# Patient Record
Sex: FEMALE | Race: WHITE | Employment: UNEMPLOYED | URBAN - METROPOLITAN AREA
[De-identification: names, ages, dates, MRNs, and addresses within clinical notes are randomized per-mention and may not be internally consistent; named-entity substitution may affect disease eponyms.]

---

## 2019-09-12 LAB
HEP C HIM: NORMAL
HIV 1&2 EXT: NORMAL

## 2019-09-30 ENCOUNTER — TRANSFERRED RECORDS (OUTPATIENT)
Dept: HEALTH INFORMATION MANAGEMENT | Facility: CLINIC | Age: 24
End: 2019-09-30

## 2019-09-30 LAB
C TRACH DNA SPEC QL PROBE+SIG AMP: NEGATIVE
HPV ABSTRACT: NORMAL
N GONORRHOEA DNA SPEC QL PROBE+SIG AMP: NEGATIVE
PAP-ABSTRACT: NORMAL
SPECIMEN DESCRIP: NORMAL
SPECIMEN DESCRIPTION: NORMAL

## 2020-07-30 ENCOUNTER — OFFICE VISIT (OUTPATIENT)
Dept: FAMILY MEDICINE | Facility: CLINIC | Age: 25
End: 2020-07-30
Payer: COMMERCIAL

## 2020-07-30 VITALS
SYSTOLIC BLOOD PRESSURE: 133 MMHG | TEMPERATURE: 96.1 F | OXYGEN SATURATION: 97 % | HEIGHT: 64 IN | BODY MASS INDEX: 37.22 KG/M2 | HEART RATE: 58 BPM | WEIGHT: 218 LBS | DIASTOLIC BLOOD PRESSURE: 78 MMHG

## 2020-07-30 DIAGNOSIS — Z91.09 ENVIRONMENTAL ALLERGIES: ICD-10-CM

## 2020-07-30 DIAGNOSIS — R06.83 SNORING: Primary | ICD-10-CM

## 2020-07-30 DIAGNOSIS — R13.10 DYSPHAGIA, UNSPECIFIED TYPE: ICD-10-CM

## 2020-07-30 PROBLEM — F15.922: Status: ACTIVE | Noted: 2017-11-21

## 2020-07-30 PROBLEM — F19.21 HISTORY OF DRUG DEPENDENCE/ABUSE (H): Status: ACTIVE | Noted: 2017-06-02

## 2020-07-30 PROBLEM — A74.9 CHLAMYDIAL INFECTION: Status: ACTIVE | Noted: 2017-11-21

## 2020-07-30 PROCEDURE — 99204 OFFICE O/P NEW MOD 45 MIN: CPT | Performed by: NURSE PRACTITIONER

## 2020-07-30 RX ORDER — CETIRIZINE HYDROCHLORIDE 5 MG/1
10 TABLET ORAL DAILY
Qty: 300 ML | Refills: 0 | Status: SHIPPED | OUTPATIENT
Start: 2020-07-30 | End: 2020-10-22

## 2020-07-30 RX ORDER — FAMOTIDINE 40 MG/5ML
2.5 POWDER, FOR SUSPENSION ORAL DAILY
COMMUNITY
Start: 2020-07-13 | End: 2020-10-22

## 2020-07-30 SDOH — HEALTH STABILITY: MENTAL HEALTH: HOW OFTEN DO YOU HAVE A DRINK CONTAINING ALCOHOL?: NEVER

## 2020-07-30 ASSESSMENT — MIFFLIN-ST. JEOR: SCORE: 1705.9

## 2020-07-30 NOTE — PATIENT INSTRUCTIONS
Referral to sleep medicine for sleep study to look for sleep apnea.     Referral to allergist to continue testing that was started with [facility].     Start Zyrtec 10 ml daily for allergies.

## 2020-07-30 NOTE — PROGRESS NOTES
Subjective     Radha Domingo is a 25 year old female who presents to clinic today for the following health issues:    HPI     Patient is here with request for referrals.  New patient to this clinic.  Per chart review, she was seen in the ER on 7/10/20 reporting throat tightness.  Had the feeling that her throat was closing for several days.  Had pain with swallowing.  This was also associated with some seasonal allergy symptoms.  Had recently been around her friends cat.  Has been eating a lot of nuts.  No history of nut allergy, but they seem to make her throat worse.    also notes patient has been snoring more and waking up at night with difficulty breathing that is concerning for sleep apnea. In the ER she was in stable condition.  COVID-19 testing negative.  Apparently had problems like this 1 year ago but had to put work-up on hold because she became pregnant.      From ER, patient was referred to ENT. Also told she probably needs a sleep study.  She saw ENT on 7/13/20, visit note is not available, but I can see provider ordered xray esophogram which showed a tiny non-obstructive esophageal web in the upper esophagus measuring approximately 2 mm.  Patient states she was referred to [facility], had EGD recently with dilation of her esophagus and biopsies taken. Patient started on liquid famotidine, states pills get stuck.  Starting to feel a little better.  She then saw allergist through [facility] who ordered labs and asked patient to start either Claritin or Zyrtec.  Patient has not had lab work done yet.  She found out [facility] wasn't in her network and that is why she was referred to [facility]- patient needs to establish with new allergist and get set up with sleep study.       Patient Active Problem List   Diagnosis     Chlamydial infection     History of drug dependence/abuse (H)     Stimulant intoxication with perceptual disturbance (H)     Environmental allergies     Dysphagia,  "unspecified type     History reviewed. No pertinent surgical history.    Social History     Tobacco Use     Smoking status: Former Smoker     Smokeless tobacco: Never Used   Substance Use Topics     Alcohol use: Never     Frequency: Never     History reviewed. No pertinent family history.      Current Outpatient Medications   Medication Sig Dispense Refill     cetirizine (ZYRTEC) 5 MG/5ML solution Take 10 mLs (10 mg) by mouth daily 300 mL 0     famotidine (PEPCID) 40 MG/5ML suspension Take 2.5 mLs by mouth daily       Allergies   Allergen Reactions     Animal Dander Swelling       Reviewed and updated as needed this visit by Provider  Tobacco  Allergies  Meds  Problems  Med Hx  Surg Hx  Fam Hx         Review of Systems   Constitutional, HEENT, cardiovascular, pulmonary, GI, , musculoskeletal, neuro, skin, endocrine and psych systems are negative, except as otherwise noted.      Objective    /78   Pulse 58   Temp 96.1  F (35.6  C)   Ht 1.613 m (5' 3.5\")   Wt 98.9 kg (218 lb)   SpO2 97%   BMI 38.01 kg/m    Body mass index is 38.01 kg/m .  Physical Exam   GENERAL: healthy, alert and no distress  EYES: Eyes grossly normal to inspection, PERRL and conjunctivae and sclerae normal  HENT: ear canals and TM's normal, nose and mouth without ulcers or lesions  NECK: no adenopathy, no asymmetry, masses, or scars and thyroid normal to palpation  RESP: lungs clear to auscultation - no rales, rhonchi or wheezes  CV: regular rate and rhythm, normal S1 S2, no S3 or S4, no murmur, click or rub, no peripheral edema and peripheral pulses strong  MS: no gross musculoskeletal defects noted, no edema  SKIN: no suspicious lesions or rashes  NEURO: Normal strength and tone, mentation intact and speech normal  PSYCH: mentation appears normal, affect normal/bright    Diagnostic Test Results:  Labs reviewed in Epic        Assessment & Plan       ICD-10-CM    1. Snoring  R06.83 SLEEP EVALUATION & MANAGEMENT REFERRAL - ADULT " -[facility] [place]  [phone] (Age 15 and up)   2. Dysphagia, unspecified type  R13.10    3. Environmental allergies  Z91.09 ALLERGY/ASTHMA ADULT REFERRAL     cetirizine (ZYRTEC) 5 MG/5ML solution       See Patient Instructions  Patient Instructions   Referral to sleep medicine for sleep study to look for sleep apnea.     Referral to allergist to continue testing that was started with [facility].     Start Zyrtec 10 ml daily for allergies.         Return in about 4 weeks (around 8/27/2020) for If failure to improve.    Deana Mcintyre, [facility]

## 2020-10-22 ENCOUNTER — OFFICE VISIT (OUTPATIENT)
Dept: ALLERGY | Facility: CLINIC | Age: 25
End: 2020-10-22
Payer: COMMERCIAL

## 2020-10-22 VITALS
HEIGHT: 62 IN | BODY MASS INDEX: 40.85 KG/M2 | DIASTOLIC BLOOD PRESSURE: 84 MMHG | OXYGEN SATURATION: 96 % | WEIGHT: 222 LBS | HEART RATE: 75 BPM | SYSTOLIC BLOOD PRESSURE: 130 MMHG

## 2020-10-22 DIAGNOSIS — J30.89 ALLERGIC RHINITIS DUE TO DUST MITE: ICD-10-CM

## 2020-10-22 DIAGNOSIS — R13.10 DYSPHAGIA, UNSPECIFIED TYPE: Primary | ICD-10-CM

## 2020-10-22 DIAGNOSIS — R09.89 THROAT TIGHTNESS: ICD-10-CM

## 2020-10-22 DIAGNOSIS — L50.3 DERMATOGRAPHISM: ICD-10-CM

## 2020-10-22 DIAGNOSIS — L50.9 HIVES: ICD-10-CM

## 2020-10-22 PROCEDURE — 95004 PERQ TESTS W/ALRGNC XTRCS: CPT | Performed by: ALLERGY & IMMUNOLOGY

## 2020-10-22 PROCEDURE — 36415 COLL VENOUS BLD VENIPUNCTURE: CPT | Performed by: ALLERGY & IMMUNOLOGY

## 2020-10-22 PROCEDURE — 86003 ALLG SPEC IGE CRUDE XTRC EA: CPT | Performed by: ALLERGY & IMMUNOLOGY

## 2020-10-22 PROCEDURE — 86003 ALLG SPEC IGE CRUDE XTRC EA: CPT | Mod: 90 | Performed by: ALLERGY & IMMUNOLOGY

## 2020-10-22 PROCEDURE — 86008 ALLG SPEC IGE RECOMB EA: CPT | Mod: 59 | Performed by: ALLERGY & IMMUNOLOGY

## 2020-10-22 PROCEDURE — 99244 OFF/OP CNSLTJ NEW/EST MOD 40: CPT | Mod: 25 | Performed by: ALLERGY & IMMUNOLOGY

## 2020-10-22 PROCEDURE — 99000 SPECIMEN HANDLING OFFICE-LAB: CPT | Performed by: ALLERGY & IMMUNOLOGY

## 2020-10-22 RX ORDER — FLUTICASONE PROPIONATE 50 MCG
2 SPRAY, SUSPENSION (ML) NASAL DAILY
Qty: 16 G | Refills: 11 | Status: SHIPPED | OUTPATIENT
Start: 2020-10-22

## 2020-10-22 RX ORDER — AZELASTINE 1 MG/ML
2 SPRAY, METERED NASAL 2 TIMES DAILY
Qty: 1 BOTTLE | Refills: 11 | Status: SHIPPED | OUTPATIENT
Start: 2020-10-22

## 2020-10-22 ASSESSMENT — PAIN SCALES - GENERAL: PAINLEVEL: NO PAIN (0)

## 2020-10-22 ASSESSMENT — MIFFLIN-ST. JEOR: SCORE: 1700.24

## 2020-10-22 NOTE — ASSESSMENT & PLAN NOTE
Throat tightness after consumption of peanuts, tree nuts and sesame.  Could not reliably interpret skin testing today.    -Serum IgE for peanut, tree nuts and sesame.  -Continue to avoid these foods for now.    She has had persistent phlegm in the back of her throat.  If does not get better with Flonase and Astelin would consider PPI.

## 2020-10-22 NOTE — PATIENT INSTRUCTIONS
Allergy Staff Appt Hours Shot Hours Locations    Physician     Flex Myers DO       Support Staff     JORY Sam, LAMONTE  Tuesday:        [place] 7-4:20     Wednesday:        [place]: 7-5     Thursday:                    [place] 7-6:40     Friday:  [place]  7-2:40   [place]        Thursday: 1-5:50        Friday: 7-10:50     [place]        Tuesday: 7- 3:20        Wednesday: 7-4:20     [place] Monday: 7-4:20        Tuesday: 1-6:20         [facility]  [address] [place] [address]  Appt Line: [phone]  Allergy RN:  [phone]    [facility]  [address] [place] [address]  Appt Line: [phone]  Allergy RN:  [phone]       Important Scheduling Information  Aspirin Desensitization: Appt will last 2 clinic days. Please call the Allergy RN line for your clinic to schedule. Discontinue antihistamines 7 days prior to the appointment.     Food Challenges: Appt will last 3-4 hours. Please call the Allergy RN line for your clinic to schedule. Discontinue antihistamines 7 days prior to the appointment.     Penicillin Testing: Appt will last 2-3 hours. Please call the Allergy RN line for your clinic to schedule. Discontinue antihistamines 7 days prior to the appointment.     Skin Testing: Appt will about 40 minutes. Call the appointment line for your clinic to schedule. Discontinue antihistamines 7 days prior to the appointment.     Venom Testing: Appt will last 2-3 hours. Please call the Allergy RN line for your clinic to schedule. Discontinue antihistamines 7 days prior to the appointment.     Thank you for trusting us with your Allergy, Asthma, and Immunology care. Please feel free to contact us with any questions or concerns you may have.      - Start Flonase 2 sprays/nostril daily. Use consistently  - EGD order placed.   - Avoid peanut, tree nuts, and sesame. Blood testing today.     AEROALLERGEN AVOIDANCE INSTRUCTIONS  MOLD  Indoors, mold season is year  round. Outdoors, most mold prefer seasons with high humidity. Mold prefers damp, dark, warm places. Here are some tips on how to avoid mold exposure.  1.  Keep humidity inside between 35-50% with air conditioning or dehumidifier. The humidity level can be checked with a meter from a hardware store.   2.  Clean surfaces where mold grows and dry wet areas.  3.  Avoid steam cleaning carpets and discard moldy belongings.  4.  Wear a mask when doing yard work and refrain from walking through uncut fields or playing in leaves.  5.  Minimize use of potted plants and do not keep them indoors.  6.  Consider an allergy cover for the pillow and mattress.  POLLEN  Pollens are the tiny airborne particles given off by trees, weeds, and grasses. They can be the cause of seasonal allergic rhinitis or hay fever symptoms, which include stuffy, itchy, runny nose, redness, swelling and itching of the eyes, and itching of the ears and throat. Here are some tips on how to avoid pollen exposure.  1. .Keep windows closed and use the air conditioner when possible.  2.  Avoid outside exposure in the early morning as pollen counts are highest at that time.  3.  Take a shower and wash hair each night.  4.  Consider wearing a mask when working in the yard and/or garden.  5.  Clean furnace filter monthly with HEPA filters. Consider a HEPA filter vacuum  which will prevent pollen from being reintroduced into the air.   DUST MITES  Dust mites can never be entirely eliminated in the house no matter how clean your house is. Dust mites are attracted to warm, moist areas and feed on dead skin flakes. Here are tips to minimize dust mites in your home.  1.  Encase pillows and mattress/box springs in zippered allergy covers.  2.  Wash bedding in hot water (at least 130 F) every 7-14 days.  3.  Avoid curtains, carpet, and upholstered furniture if possible.  4.  Use HEPA air filters and a HEPA filter vacuum . Change filters monthly. Vacuum  weekly.  5.  Keep bedroom simple, avoiding clutter, so it can quickly be dusted.  6.  Cover heating vents with vent filters.  7.  Keep stuffed toys in a closed container and wash or freeze regularly.  8.  Keep clothing in the closet with the door closed.  PETS  Pets present many problems for people with allergies. Dander from pets is very difficult to remove and also is a food source for dust mites.  1.  If possible, find the pet a new home.  2.  If not possible, keep the pet outdoors. Never allow the pet into the bedroom.  3.  Wash pet weekly in warm water.  4.  Encase mattresses, pillows, and box springs in allergen-proof covers.  5.  Use HEPA air filters and a HEPA filter vacuum . Change filters monthly.

## 2020-10-22 NOTE — PROGRESS NOTES
Radha Domingo is a 25 year old White female with previous medical history significant for esophageal web. Radha Domingo is being seen today for evaluation of dysphagia, food allergy. The patient is being seen in consultation at the request of Deana Mcintyre NP.     Patient reports that over the course of the last 1 to 2 years she has had significant mucus in the back of her throat.  She feels like this is present every day.  She feels like she has difficulty swallowing.  This occurs with all meals.  This occurs more significantly with meat, lettuce and cheese.  Used to be able to pass the foods with water but now this is of unclear benefit.  She coughs up mucus multiple times per week.  She has vomited from food getting caught in her throat.  She did have a x-ray which showed an esophageal web.  She is followed with ENT this was a small web.  She has never had an EGD.  She has never been on PPI.  She did have acid reflux when she was pregnant and was treated with Zantac and Tums.  Unclear if this was beneficial.  She does have throat tightness and tongue swelling with peanuts and tree nuts.  With sesame seed he has had tightness in her throat.  No history of allergy testing for these foods.  She does get mucus in the back of her throat that is constantly there.  She is unclear if this is from postnasal drainage.  She denies congestion.  With  cats she will develop hives.  She has cats in her house.  No other seasonal allergy complaints.  Denies history of asthma.      History reviewed. No pertinent past medical history.  History reviewed. No pertinent family history.  History reviewed. No pertinent surgical history.    REVIEW OF SYSTEMS:  General: negative for weight gain. negative for weight loss. negative for changes in sleep.   Ears: negative for fullness. negative for hearing loss. negative for dizziness.   Nose: negative for snoring.negative for changes in smell. positive  for drainage.   Eyes: positive   for eye watering. positive  for eye itching. negative for vision changes. positive  for eye redness.  Throat: negative for hoarseness. negative for sore throat. negative for trouble swallowing.   Lungs: positive  for shortness of breath.positive  for wheezing. positive  for sputum production.   Cardiovascular: negative for chest pain. negative for swelling of ankles. negative for fast or irregular heartbeat.   Gastrointestinal: negative for nausea. negative for heartburn. negative for acid reflux.   Musculoskeletal: negative for joint pain. negative for joint stiffness. negative for joint swelling.   Neurologic: negative for seizures. negative for fainting. negative for weakness.   Psychiatric: negative for changes in mood. negative for anxiety.   Endocrine: negative for cold intolerance. negative for heat intolerance. negative for tremors.   Lymphatic: negative for lower extremity swelling. negative for lymph node swelling.   Hematologic: negative for easy bruising. negative for easy bleeding.  Integumentary: positive  for rash. negative for scaling. negative for nail changes.       Current Outpatient Medications:      azelastine (ASTELIN) 0.1 % nasal spray, Spray 2 sprays into both nostrils 2 times daily, Disp: 1 Bottle, Rfl: 11     fluticasone (FLONASE) 50 MCG/ACT nasal spray, Spray 2 sprays into both nostrils daily, Disp: 16 g, Rfl: 11  Immunization History   Administered Date(s) Administered     DTAP (<7y) 1994, 1994, 1994, 09/05/1995, 05/06/1998     FLU 6-35 months 09/20/2011, 10/11/2012     HPV Quadrivalent 05/14/2010, 09/27/2010     Hep B, Peds or Adolescent 1994, 02/07/1995, 05/03/1995     HepB, Unspecified 09/05/1995     Hib, Unspecified 1994, 1994, 02/07/1995, 09/05/1995     MMR 03/07/1995, 05/27/2004     Polio, Unspecified  1994, 1994, 02/07/1995, 05/06/1998     TDAP Vaccine (Boostrix) 09/10/2011, 01/24/2020     Td (Adult), Adsorbed 07/29/2006      Allergies   Allergen Reactions     Animal Dander Swelling         EXAM:   Constitutional:  Appears well-developed and well-nourished. No distress.   HEENT:   Head: Normocephalic.   Nasal tissue pink and normal appearing.  No rhinorrhea noted.    Eyes: Conjunctivae are non-erythematous   Cardiovascular: Normal rate, regular rhythm and normal heart sounds. Exam reveals no gallop and no friction rub.   No murmur heard.  Respiratory: Effort normal and breath sounds normal. No respiratory distress. No wheezes. No rales.   Musculoskeletal: Normal range of motion.   Neuro: Oriented to person, place, and time.  Skin: Skin is warm and dry. No rash noted.   Psychiatric: Normal mood and affect.     Nursing note and vitals reviewed.      WORKUP:   ENVIRONMENTAL PERCUTANEOUS SKIN TESTING: ADULT  [facility] 10/22/2020   Consent Y   Ordering Physician Lucia   Interpreting Physician Lucia   Testing Technician Eri   Location Back   Time start: 11:30 AM   Time End: 11:45 AM   Positive Control: Histatrol*ALK 1 mg/ml 4/5   Negative Control: 50% Glycerin 4/12   Cat Hair*ALK (10,000 BAU/ml) 6/22   AP Dog Hair/Dander (1:100 w/v) 4/22   Dust Mite p. 30,000 AU/ml 13/55   Dust Mite f. (30,000 AU/ml) 4/20   Seth (W/F in millimeters) 0   Bhanu Grass (100,000 BAU/mL) 0   Red Cedar (W/F in millimeters) 3/3   Maple/Mono (W/F in millimeters) 3/3   Hackberry (W/F in millimeters) 0   Jean (W/F in millimeters) 0   Winder *ALK (W/F in millimeters) 0   American Elm (W/F in millimeters) 3/3   [place] (W/F in millimeters) 0   Black [place] (W/F in millimeters) 0   Birch Mix (W/F in millimeters) 3/3   [place] (W/F in millimeters) 4/4   [place] (W/F in millimeters) 4/4   Cocklebur (W/F in millimeters) 4/4   Fraziers Bottom (W/F in millimeters) 4/4   White Hang (W/F in millimeters) 4/4   Careless (W/F in millimeters) 4/4   English Plantain (W/F in millimeters) 4/4   Kochia (W/F in millimeters) 5/5   Lamb's Quarter (W/F in  millimeters) 4/4   Marshelder (W/F in millimeters) 4/4   Ragweed Mix* ALK (W/F in millimeters) 4/4   Russian Thistle (W/F in millimeters) 4/4   Sagebrush/Mugwort (W/F in millimeters) 4/4   Sheep Sorrel (W/F in millimeters) 4/4   Feather Mix* ALK (W/F in millimeters) 4/4   Penicillium Mix (1:10 w/v) 4/4   Curvularia spicifera (1:10 w/v) 4/4   Epicoccum (1:10 w/v) 4/4   Aspergillus fumigatus (1:10 w/v): 4/4   Alternaria tenius (1:10 w/v) 4/4   H. Cladosporium (1:10 w/v) 4/4   Phoma herbarum (1:10 w/v) 4/4      NUTS/SHELLFISH ALLERGEN PERCUTANEOUS SKIN TESTING  [place] [facility] 10/22/2020   Peanut 1:20 (W/F in millimeters) 4/4   [place]  1:20 (W/F in millimeters) 4/4   Cashew  1:20 (W/F in millimeters) 4/4   Pecan  1:20 (W/F in millimeters) 4/4   Pistachio*ALK (1:10 w/v) 4/4   [place] 1:20 (W/F in millimeters) 4/4   Hazelnut (Filbert)  1:20 (W/F in millimeters) 4/4   Brazil Nut  1:20 (W/F in millimeters) 4/4      FOOD ALLERGEN PERCUTANEOUS SKIN TESTING  [facility]  10/22/2020   Consent Y   Ordering Physician Lucia   Interpreting Physician Lucia   Testing Technician Eri Nichols Back   Time start: 11:30 AM   Time End: 11:45 AM   Positive Control: Histatrol*ALK 1 mg/ml 4/5   Negative Control: 50% Glycerin**Kristen Melba 4/12   Peanut 1:20 (W/F in millimeters) 4/4   [place]  1:20 (W/F in millimeters) 4/4   Cashew  1:20 (W/F in millimeters) 4/4   Pecan  1:20 (W/F in millimeters) 4/4   Pistachio*ALK (1:10 w/v) 4/4   [place] 1:20 (W/F in millimeters) 4/4   Hazelnut (Filbert)  1:20 (W/F in millimeters) 4/4   Brazil Nut  1:20 (W/F in millimeters) 4/4   Soy 1:40 w/v -   Coconut 1:20 (W/F in millimeters) -   Sesame Seed  1:20 (W/F in millimeters) 4/4   Wheat 1:20 (W/F in millimeters) -   Corn 1:40 (W/F in millimeters) -   Rice 1:20 (W/F in millimeters) -   Rye 1:20 (W/F in millimeters) -   Barley 1:20 (W/F in millimeters) -   Oat 1:20 (W/F in millimeters) -   Yeast AllerMed 1:10 w/v -         ASSESSMENT/PLAN:  Problem List Items Addressed This Visit        Nervous and Auditory    Throat tightness     Throat tightness after consumption of peanuts, tree nuts and sesame.  Could not reliably interpret skin testing today.    -Serum IgE for peanut, tree nuts and sesame.  -Continue to avoid these foods for now.    She has had persistent phlegm in the back of her throat.  If does not get better with Flonase and Astelin would consider PPI.         Relevant Orders    ALLERGY SKIN TESTS,ALLERGENS [[id number]] (Completed)    Allergen peanut IgE    Peanut Component Allergy Panel    Allergen almonds IgE    Allergen cashew IgE    Allergen pecan nut IgE    Allergen sesame seed IgE    Allergen pistachio nut IgE    Allergen walnuts IgE       Respiratory    Allergic rhinitis due to dust mite     Hives if she is around cats.  Persistent phlegm in the back of her throat.    Skin testing positive for dust mites only.  However dermatographism noted.    -Serum IgE for environmental allergies excited from dust mites.  -Allergen avoidance measures were discussed and literature provided.  -Flonase 2 sprays per nostril daily.  -Astelin 2 sprays per nostril twice daily.         Relevant Medications    fluticasone (FLONASE) 50 MCG/ACT nasal spray    azelastine (ASTELIN) 0.1 % nasal spray    Other Relevant Orders    ALLERGY SKIN TESTS,ALLERGENS [[id number]] (Completed)    Allergen cat epithellium IgE    Allergen dog epithelium IgE    Allergen alexandre IgE    Allergen alternaria alternata IgE    Allergen aspergillus fumigatus IgE    Allergen cladosporium herbarum IgE    Allergen Epicoccum purpurascens IgE    Allergen penicillium notatum IgE    Allergen everett white IgE    Allergen Cedar IgE    Allergen cottonwood IgE    Allergen elm IgE    Allergen maple box elder IgE    Allergen oak white IgE    Allergen silver  birch IgE    Allergen Tree White [place] IgE    Allergen [place]on Tree    Allergen white pine IgE    Allergen English plantain IgE     Allergen giant ragweed IgE    Allergen lamb's quarter IgE    Allergen Mugwort IgE    Allergen ragweed short IgE    Allergen Sheep Sorrel IgE    Allergen thistle Russian IgE    Allergen Weed Nettle IgE       Digestive    Dysphagia, unspecified type - Primary     For the last 1 to 2 years she has had persistent dysphagia.  Occurs with meat, lettuce and cheese.  She has had vomiting associated with this.  She had an esophageal web but this was minimal in size.  Follows with ENT.  No history of EGD.  She has been on Zantac.  Never been on PPI.    -EGD.  Possible eosinophilic esophagitis versus other etiology.         Relevant Orders    GASTROENTEROLOGY ADULT REF PROCEDURE ONLY       Musculoskeletal and Integumentary    Hives    Relevant Medications    fluticasone (FLONASE) 50 MCG/ACT nasal spray    azelastine (ASTELIN) 0.1 % nasal spray    Other Relevant Orders    ALLERGY SKIN TESTS,ALLERGENS [[id number]] (Completed)      Other Visit Diagnoses     Dermatographism        Relevant Medications    fluticasone (FLONASE) 50 MCG/ACT nasal spray    azelastine (ASTELIN) 0.1 % nasal spray          Chart documentation with Dragon Voice recognition Software. Although reviewed after completion, some words and grammatical errors may remain.    Flex Myers DO FAAAAI  Medical Director for Allergy/Immunology at [place], [place]

## 2020-10-22 NOTE — ASSESSMENT & PLAN NOTE
Hives if she is around cats.  Persistent phlegm in the back of her throat.    Skin testing positive for dust mites only.  However dermatographism noted.    -Serum IgE for environmental allergies excited from dust mites.  -Allergen avoidance measures were discussed and literature provided.  -Flonase 2 sprays per nostril daily.  -Astelin 2 sprays per nostril twice daily.

## 2020-10-22 NOTE — LETTER
10/22/2020         RE: Radha Domingo  [address]  [address]  [place] [place] [address]        Dear Colleague,    Thank you for referring your patient, Radha Domingo, to the [facility]. Please see a copy of my visit note below.    Radha Domingo is a 25 year old White female with previous medical history significant for esophageal web. Radha Domingo is being seen today for evaluation of dysphagia, food allergy. The patient is being seen in consultation at the request of Deana Mcintyre NP.     Patient reports that over the course of the last 1 to 2 years she has had significant mucus in the back of her throat.  She feels like this is present every day.  She feels like she has difficulty swallowing.  This occurs with all meals.  This occurs more significantly with meat, lettuce and cheese.  Used to be able to pass the foods with water but now this is of unclear benefit.  She coughs up mucus multiple times per week.  She has vomited from food getting caught in her throat.  She did have a x-ray which showed an esophageal web.  She is followed with ENT this was a small web.  She has never had an EGD.  She has never been on PPI.  She did have acid reflux when she was pregnant and was treated with Zantac and Tums.  Unclear if this was beneficial.  She does have throat tightness and tongue swelling with peanuts and tree nuts.  With sesame seed he has had tightness in her throat.  No history of allergy testing for these foods.  She does get mucus in the back of her throat that is constantly there.  She is unclear if this is from postnasal drainage.  She denies congestion.  With  cats she will develop hives.  She has cats in her house.  No other seasonal allergy complaints.  Denies history of asthma.      History reviewed. No pertinent past medical history.  History reviewed. No pertinent family history.  History reviewed. No pertinent surgical history.    REVIEW OF SYSTEMS:  General: negative for  weight gain. negative for weight loss. negative for changes in sleep.   Ears: negative for fullness. negative for hearing loss. negative for dizziness.   Nose: negative for snoring.negative for changes in smell. positive  for drainage.   Eyes: positive  for eye watering. positive  for eye itching. negative for vision changes. positive  for eye redness.  Throat: negative for hoarseness. negative for sore throat. negative for trouble swallowing.   Lungs: positive  for shortness of breath.positive  for wheezing. positive  for sputum production.   Cardiovascular: negative for chest pain. negative for swelling of ankles. negative for fast or irregular heartbeat.   Gastrointestinal: negative for nausea. negative for heartburn. negative for acid reflux.   Musculoskeletal: negative for joint pain. negative for joint stiffness. negative for joint swelling.   Neurologic: negative for seizures. negative for fainting. negative for weakness.   Psychiatric: negative for changes in mood. negative for anxiety.   Endocrine: negative for cold intolerance. negative for heat intolerance. negative for tremors.   Lymphatic: negative for lower extremity swelling. negative for lymph node swelling.   Hematologic: negative for easy bruising. negative for easy bleeding.  Integumentary: positive  for rash. negative for scaling. negative for nail changes.       Current Outpatient Medications:      azelastine (ASTELIN) 0.1 % nasal spray, Spray 2 sprays into both nostrils 2 times daily, Disp: 1 Bottle, Rfl: 11     fluticasone (FLONASE) 50 MCG/ACT nasal spray, Spray 2 sprays into both nostrils daily, Disp: 16 g, Rfl: 11  Immunization History   Administered Date(s) Administered     DTAP (<7y) 1994, 1994, 1994, 09/05/1995, 05/06/1998     FLU 6-35 months 09/20/2011, 10/11/2012     HPV Quadrivalent 05/14/2010, 09/27/2010     Hep B, Peds or Adolescent 1994, 02/07/1995, 05/03/1995     HepB, Unspecified 09/05/1995     Hib,  Unspecified 1994, 1994, 02/07/1995, 09/05/1995     MMR 03/07/1995, 05/27/2004     Polio, Unspecified  1994, 1994, 02/07/1995, 05/06/1998     TDAP Vaccine (Boostrix) 09/10/2011, 01/24/2020     Td (Adult), Adsorbed 07/29/2006     Allergies   Allergen Reactions     Animal Dander Swelling         EXAM:   Constitutional:  Appears well-developed and well-nourished. No distress.   HEENT:   Head: Normocephalic.   Nasal tissue pink and normal appearing.  No rhinorrhea noted.    Eyes: Conjunctivae are non-erythematous   Cardiovascular: Normal rate, regular rhythm and normal heart sounds. Exam reveals no gallop and no friction rub.   No murmur heard.  Respiratory: Effort normal and breath sounds normal. No respiratory distress. No wheezes. No rales.   Musculoskeletal: Normal range of motion.   Neuro: Oriented to person, place, and time.  Skin: Skin is warm and dry. No rash noted.   Psychiatric: Normal mood and affect.     Nursing note and vitals reviewed.      WORKUP:   ENVIRONMENTAL PERCUTANEOUS SKIN TESTING: ADULT  [place] [facility] 10/22/2020   Consent Y   Ordering Physician Lucia   Interpreting Physician Lucia   Testing Technician Eri   Location Back   Time start: 11:30 AM   Time End: 11:45 AM   Positive Control: Histatrol*ALK 1 mg/ml 4/5   Negative Control: 50% Glycerin 4/12   Cat Hair*ALK (10,000 BAU/ml) 6/22   AP Dog Hair/Dander (1:100 w/v) 4/22   Dust Mite p. 30,000 AU/ml 13/55   Dust Mite f. (30,000 AU/ml) 4/20   Seth (W/F in millimeters) 0   Bhanu Grass (100,000 BAU/mL) 0   Red Cedar (W/F in millimeters) 3/3   Maple/[place]ecosta (W/F in millimeters) 3/3   Hackberry (W/F in millimeters) 0   St. Bernard (W/F in millimeters) 0   Ness *ALK (W/F in millimeters) 0   American Elm (W/F in millimeters) 3/3   [place] (W/F in millimeters) 0   Black Korbel (W/F in millimeters) 0   Birch Mix (W/F in millimeters) 3/3   Gresham (W/F in millimeters) 4/4   Albion (W/F in millimeters) 4/4    Cocklebur (W/F in millimeters) 4/4   [place] (W/F in millimeters) 4/4   White Hang (W/F in millimeters) 4/4   Careless (W/F in millimeters) 4/4   English Plantain (W/F in millimeters) 4/4   Kochia (W/F in millimeters) 5/5   Lamb's Quarter (W/F in millimeters) 4/4   Marshelder (W/F in millimeters) 4/4   Ragweed Mix* ALK (W/F in millimeters) 4/4   Russian Thistle (W/F in millimeters) 4/4   Sagebrush/Mugwort (W/F in millimeters) 4/4   Sheep Sorrel (W/F in millimeters) 4/4   Feather Mix* ALK (W/F in millimeters) 4/4   Penicillium Mix (1:10 w/v) 4/4   Curvularia spicifera (1:10 w/v) 4/4   Epicoccum (1:10 w/v) 4/4   Aspergillus fumigatus (1:10 w/v): 4/4   Alternaria tenius (1:10 w/v) 4/4   H. Cladosporium (1:10 w/v) 4/4   Phoma herbarum (1:10 w/v) 4/4      NUTS/SHELLFISH ALLERGEN PERCUTANEOUS SKIN TESTING  [place] [employer] 10/22/2020   Peanut 1:20 (W/F in millimeters) 4/4   Lajas  1:20 (W/F in millimeters) 4/4   Cashew  1:20 (W/F in millimeters) 4/4   Pecan  1:20 (W/F in millimeters) 4/4   Pistachio*ALK (1:10 w/v) 4/4   [place] 1:20 (W/F in millimeters) 4/4   Hazelnut (Filbert)  1:20 (W/F in millimeters) 4/4   Brazil Nut  1:20 (W/F in millimeters) 4/4      FOOD ALLERGEN PERCUTANEOUS SKIN TESTING  [place] [employer]  10/22/2020   Consent Y   Ordering Physician Lucia   Interpreting Physician Lucia   Testing Technician Eri   Location Back   Time start: 11:30 AM   Time End: 11:45 AM   Positive Control: Histatrol*ALK 1 mg/ml 4/5   Negative Control: 50% Glycerin**Brohman Melba 4/12   Peanut 1:20 (W/F in millimeters) 4/4   Lajas  1:20 (W/F in millimeters) 4/4   Cashew  1:20 (W/F in millimeters) 4/4   Pecan  1:20 (W/F in millimeters) 4/4   Pistachio*ALK (1:10 w/v) 4/4   [place] 1:20 (W/F in millimeters) 4/4   Hazelnut (Filbert)  1:20 (W/F in millimeters) 4/4   Brazil Nut  1:20 (W/F in millimeters) 4/4   Soy 1:40 w/v -   Coconut 1:20 (W/F in millimeters) -   Sesame Seed  1:20 (W/F in millimeters) 4/4   Wheat 1:20 (W/F in  millimeters) -   Corn 1:40 (W/F in millimeters) -   Rice 1:20 (W/F in millimeters) -   Rye 1:20 (W/F in millimeters) -   Barley 1:20 (W/F in millimeters) -   Oat 1:20 (W/F in millimeters) -   Yeast AllerMed 1:10 w/v -        ASSESSMENT/PLAN:  Problem List Items Addressed This Visit        Nervous and Auditory    Throat tightness     Throat tightness after consumption of peanuts, tree nuts and sesame.  Could not reliably interpret skin testing today.    -Serum IgE for peanut, tree nuts and sesame.  -Continue to avoid these foods for now.    She has had persistent phlegm in the back of her throat.  If does not get better with Flonase and Astelin would consider PPI.         Relevant Orders    ALLERGY SKIN TESTS,ALLERGENS [88808] (Completed)    Allergen peanut IgE    Peanut Component Allergy Panel    Allergen almonds IgE    Allergen cashew IgE    Allergen pecan nut IgE    Allergen sesame seed IgE    Allergen pistachio nut IgE    Allergen walnuts IgE       Respiratory    Allergic rhinitis due to dust mite     Hives if she is around cats.  Persistent phlegm in the back of her throat.    Skin testing positive for dust mites only.  However dermatographism noted.    -Serum IgE for environmental allergies excited from dust mites.  -Allergen avoidance measures were discussed and literature provided.  -Flonase 2 sprays per nostril daily.  -Astelin 2 sprays per nostril twice daily.         Relevant Medications    fluticasone (FLONASE) 50 MCG/ACT nasal spray    azelastine (ASTELIN) 0.1 % nasal spray    Other Relevant Orders    ALLERGY SKIN TESTS,ALLERGENS [27405] (Completed)    Allergen cat epithellium IgE    Allergen dog epithelium IgE    Allergen alexandre IgE    Allergen alternaria alternata IgE    Allergen aspergillus fumigatus IgE    Allergen cladosporium herbarum IgE    Allergen Epicoccum purpurascens IgE    Allergen penicillium notatum IgE    Allergen everett white IgE    Allergen Cedar IgE    Allergen cottonwood IgE    Allergen  elm IgE    Allergen maple box elder IgE    Allergen oak white IgE    Allergen silver  birch IgE    Allergen Tree White Onemo IgE    Allergen Ewing Tree    Allergen white pine IgE    Allergen English plantain IgE    Allergen giant ragweed IgE    Allergen lamb's quarter IgE    Allergen Mugwort IgE    Allergen ragweed short IgE    Allergen Sheep Sorrel IgE    Allergen thistle Russian IgE    Allergen Weed Nettle IgE       Digestive    Dysphagia, unspecified type - Primary     For the last 1 to 2 years she has had persistent dysphagia.  Occurs with meat, lettuce and cheese.  She has had vomiting associated with this.  She had an esophageal web but this was minimal in size.  Follows with ENT.  No history of EGD.  She has been on Zantac.  Never been on PPI.    -EGD.  Possible eosinophilic esophagitis versus other etiology.         Relevant Orders    GASTROENTEROLOGY ADULT REF PROCEDURE ONLY       Musculoskeletal and Integumentary    Hives    Relevant Medications    fluticasone (FLONASE) 50 MCG/ACT nasal spray    azelastine (ASTELIN) 0.1 % nasal spray    Other Relevant Orders    ALLERGY SKIN TESTS,ALLERGENS [25126] (Completed)      Other Visit Diagnoses     Dermatographism        Relevant Medications    fluticasone (FLONASE) 50 MCG/ACT nasal spray    azelastine (ASTELIN) 0.1 % nasal spray          Chart documentation with Dragon Voice recognition Software. Although reviewed after completion, some words and grammatical errors may remain.    Flex Myers DO FAAAAI  Medical Director for Allergy/Immunology at [place], [place]        Again, thank you for allowing me to participate in the care of your patient.        Sincerely,        Flex Myers DO

## 2020-10-22 NOTE — ASSESSMENT & PLAN NOTE
For the last 1 to 2 years she has had persistent dysphagia.  Occurs with meat, lettuce and cheese.  She has had vomiting associated with this.  She had an esophageal web but this was minimal in size.  Follows with ENT.  No history of EGD.  She has been on Zantac.  Never been on PPI.    -EGD.  Possible eosinophilic esophagitis versus other etiology.

## 2020-10-24 LAB
CALIF WALNUT IGE QN: <0.1 KU/L
DEPRECATED MISC ALLERGEN IGE RAST QL: NORMAL

## 2020-10-25 LAB
A ALTERNATA IGE QN: <0.1 KU(A)/L
A FUMIGATUS IGE QN: <0.1 KU(A)/L
ALMOND IGE QN: <0.1 KU(A)/L
C HERBARUM IGE QN: <0.1 KU(A)/L
CASHEW NUT IGE QN: <0.1 KU(A)/L
CAT DANDER IGG QN: 0.54 KU(A)/L
CEDAR IGE QN: <0.1 KU(A)/L
COMMON RAGWEED IGE QN: <0.1 KU(A)/L
COTTONWOOD IGE QN: <0.1 KU(A)/L
DOG DANDER+EPITH IGE QN: 1 KU(A)/L
E PURPURASCENS IGE QN: <0.1 KU(A)/L
EAST WHITE PINE IGE QN: <0.1 KU(A)/L
ENGL PLANTAIN IGE QN: <0.1 KU(A)/L
GIANT RAGWEED IGE QN: <0.1 KU(A)/L
GOOSEFOOT IGE QN: <0.1 KU(A)/L
MAPLE IGE QN: <0.1 KU(A)/L
MUGWORT IGE QN: <0.1 KU(A)/L
NETTLE IGE QN: <0.1 KU(A)/L
P NOTATUM IGE QN: <0.1 KU(A)/L
PEANUT (RARA H) 1 IGE QN: <0.1 KU(A)/L
PEANUT (RARA H) 2 IGE QN: <0.1 KU(A)/L
PEANUT (RARA H) 3 IGE QN: <0.1 KU(A)/L
PEANUT (RARA H) 8 IGE QN: <0.1 KU(A)/L
PEANUT (RARA H) 9 IGE QN: <0.1 KU(A)/L
PEANUT IGE QN: <0.1 KU(A)/L
PECAN/HICK NUT IGE QN: <0.1 KU(A)/L
PISTACHIO IGE QN: <0.1 KU(A)/L
SALTWORT IGE QN: <0.1 KU(A)/L
SESAME SEED IGE QN: <0.1 KU(A)/L
SHEEP SORREL IGE QN: <0.1 KU(A)/L
SILVER BIRCH IGE QN: <0.1 KU(A)/L
TIMOTHY IGE QN: <0.1 KU(A)/L
WALNUT IGE QN: <0.1 KU(A)/L
WHITE ASH IGE QN: <0.1 KU(A)/L
WHITE ELM IGE QN: <0.1 KU(A)/L
WHITE MULBERRY IGE QN: <0.1 KU(A)/L
WHITE OAK IGE QN: <0.1 KU(A)/L

## 2020-10-26 NOTE — RESULT ENCOUNTER NOTE
Allergy testing positive for cat and dog. Negative for other allergens including the foods. Skin testing was positive for dust mites. Continue treatment as discussed.    AEROALLERGEN AVOIDANCE INSTRUCTIONS  MOLD  Indoors, mold season is year round. Outdoors, most mold prefer seasons with high humidity. Mold prefers damp, dark, warm places. Here are some tips on how to avoid mold exposure.   Keep humidity inside between 35-50% with air conditioning or dehumidifier. The humidity level can be checked with a meter from a hardware store.    Clean surfaces where mold grows and dry wet areas.   Avoid steam cleaning carpets and discard moldy belongings.   Wear a mask when doing yard work and refrain from walking through uncut fields or playing in leaves.   Minimize use of potted plants and do not keep them indoors.   Consider an allergy cover for the pillow and mattress.  POLLEN  Pollens are the tiny airborne particles given off by trees, weeds, and grasses. They can be the cause of seasonal allergic rhinitis or hay fever symptoms, which include stuffy, itchy, runny nose, redness, swelling and itching of the eyes, and itching of the ears and throat. Here are some tips on how to avoid pollen exposure.  .Keep windows closed and use the air conditioner when possible.   Avoid outside exposure in the early morning as pollen counts are highest at that time.   Take a shower and wash hair each night.   Consider wearing a mask when working in the yard and/or garden.   Clean furnace filter monthly with HEPA filters. Consider a HEPA filter vacuum  which will prevent pollen from being reintroduced into the air.   DUST MITES  Dust mites can never be entirely eliminated in the house no matter how clean your house is. Dust mites are attracted to warm, moist areas and feed on dead skin flakes. Here are tips to minimize dust mites in your home.   Encase pillows and mattress/box springs in zippered allergy covers.   Wash bedding in  hot water (at least 130 F) every 7-14 days.   Avoid curtains, carpet, and upholstered furniture if possible.   Use HEPA air filters and a HEPA filter vacuum . Change filters monthly. Vacuum weekly.   Keep bedroom simple, avoiding clutter, so it can quickly be dusted.   Cover heating vents with vent filters.   Keep stuffed toys in a closed container and wash or freeze regularly.   Keep clothing in the closet with the door closed.  PETS  Pets present many problems for people with allergies. Dander from pets is very difficult to remove and also is a food source for dust mites.   If possible, find the pet a new home.   If not possible, keep the pet outdoors. Never allow the pet into the bedroom.   Wash pet weekly in warm water.   Encase mattresses, pillows, and box springs in allergen-proof covers.   Use HEPA air filters and a HEPA filter vacuum . Change filters monthly.

## 2020-11-05 DIAGNOSIS — Z11.59 ENCOUNTER FOR SCREENING FOR OTHER VIRAL DISEASES: Primary | ICD-10-CM

## 2020-11-20 DIAGNOSIS — Z11.59 ENCOUNTER FOR SCREENING FOR OTHER VIRAL DISEASES: ICD-10-CM

## 2020-11-20 PROCEDURE — U0003 INFECTIOUS AGENT DETECTION BY NUCLEIC ACID (DNA OR RNA); SEVERE ACUTE RESPIRATORY SYNDROME CORONAVIRUS 2 (SARS-COV-2) (CORONAVIRUS DISEASE [COVID-19]), AMPLIFIED PROBE TECHNIQUE, MAKING USE OF HIGH THROUGHPUT TECHNOLOGIES AS DESCRIBED BY CMS-2020-01-R: HCPCS | Performed by: SURGERY

## 2020-11-21 LAB
SARS-COV-2 RNA SPEC QL NAA+PROBE: NOT DETECTED
SPECIMEN SOURCE: NORMAL

## 2020-11-23 ENCOUNTER — HOSPITAL ENCOUNTER (OUTPATIENT)
Facility: AMBULATORY SURGERY CENTER | Age: 25
Discharge: HOME | End: 2020-11-23
Attending: SURGERY | Admitting: SURGERY
Payer: COMMERCIAL

## 2020-11-23 VITALS
OXYGEN SATURATION: 97 % | SYSTOLIC BLOOD PRESSURE: 105 MMHG | TEMPERATURE: 96.9 F | DIASTOLIC BLOOD PRESSURE: 75 MMHG | RESPIRATION RATE: 18 BRPM | HEART RATE: 79 BPM

## 2020-11-23 DIAGNOSIS — R09.89 THROAT TIGHTNESS: Primary | ICD-10-CM

## 2020-11-23 DIAGNOSIS — J30.89 ALLERGIC RHINITIS DUE TO DUST MITE: ICD-10-CM

## 2020-11-23 LAB — UPPER GI ENDOSCOPY: NORMAL

## 2020-11-23 PROCEDURE — G8918 PT W/O PREOP ORDER IV AB PRO: HCPCS

## 2020-11-23 PROCEDURE — 43239 EGD BIOPSY SINGLE/MULTIPLE: CPT

## 2020-11-23 PROCEDURE — G8907 PT DOC NO EVENTS ON DISCHARG: HCPCS

## 2020-11-23 PROCEDURE — 43239 EGD BIOPSY SINGLE/MULTIPLE: CPT | Performed by: SURGERY

## 2020-11-23 PROCEDURE — 88305 TISSUE EXAM BY PATHOLOGIST: CPT | Performed by: PATHOLOGY

## 2020-11-23 RX ORDER — PROCHLORPERAZINE MALEATE 10 MG
10 TABLET ORAL EVERY 6 HOURS PRN
Status: DISCONTINUED | OUTPATIENT
Start: 2020-11-23 | End: 2020-11-24 | Stop reason: HOSPADM

## 2020-11-23 RX ORDER — NALOXONE HYDROCHLORIDE 0.4 MG/ML
0.4 INJECTION, SOLUTION INTRAMUSCULAR; INTRAVENOUS; SUBCUTANEOUS
Status: DISCONTINUED | OUTPATIENT
Start: 2020-11-23 | End: 2020-11-24 | Stop reason: HOSPADM

## 2020-11-23 RX ORDER — NALOXONE HYDROCHLORIDE 0.4 MG/ML
0.2 INJECTION, SOLUTION INTRAMUSCULAR; INTRAVENOUS; SUBCUTANEOUS
Status: DISCONTINUED | OUTPATIENT
Start: 2020-11-23 | End: 2020-11-24 | Stop reason: HOSPADM

## 2020-11-23 RX ORDER — ONDANSETRON 4 MG/1
4 TABLET, ORALLY DISINTEGRATING ORAL EVERY 6 HOURS PRN
Status: DISCONTINUED | OUTPATIENT
Start: 2020-11-23 | End: 2020-11-24 | Stop reason: HOSPADM

## 2020-11-23 RX ORDER — FENTANYL CITRATE 50 UG/ML
INJECTION, SOLUTION INTRAMUSCULAR; INTRAVENOUS PRN
Status: DISCONTINUED | OUTPATIENT
Start: 2020-11-23 | End: 2020-11-23 | Stop reason: HOSPADM

## 2020-11-23 RX ORDER — FLUMAZENIL 0.1 MG/ML
0.2 INJECTION, SOLUTION INTRAVENOUS
Status: DISCONTINUED | OUTPATIENT
Start: 2020-11-23 | End: 2020-11-24 | Stop reason: HOSPADM

## 2020-11-23 RX ORDER — ONDANSETRON 2 MG/ML
4 INJECTION INTRAMUSCULAR; INTRAVENOUS EVERY 6 HOURS PRN
Status: DISCONTINUED | OUTPATIENT
Start: 2020-11-23 | End: 2020-11-24 | Stop reason: HOSPADM

## 2020-11-23 RX ORDER — CETIRIZINE HYDROCHLORIDE 5 MG/1
10 TABLET ORAL DAILY
Qty: 473 ML | Refills: 1 | Status: SHIPPED | OUTPATIENT
Start: 2020-11-23

## 2020-11-23 RX ORDER — LIDOCAINE 40 MG/G
CREAM TOPICAL
Status: DISCONTINUED | OUTPATIENT
Start: 2020-11-23 | End: 2020-11-24 | Stop reason: HOSPADM

## 2020-11-23 NOTE — LETTER
[facility] [place]  [address] [place]  [place] [address]  Phone: [phone]    November 23, 2020        Radha Domingo  [address]  [address]  [facility] [address]          To whom it may concern:    RE: Radha Domingo    Had a procedure today and her  needed to  her.  Please excuse both of them for missing any work today.     Please contact me for questions or concerns.      Sincerely,        Siva Rueda MD

## 2020-11-23 NOTE — PROGRESS NOTES
Patient is asking about other medications for her cough and allergies.  States the nasal spray is not helping much any more and takes benadryl but it makes her sleepy so will get her liquid zyrtec and see if that helps.   She should follow up with her Doctar who sent her to me for the scope.   Siva Rueda MD\

## 2020-11-27 LAB — COPATH REPORT: NORMAL

## 2021-07-31 ENCOUNTER — HOSPITAL ENCOUNTER (OUTPATIENT)
Facility: CLINIC | Age: 26
End: 2021-07-31
Attending: OBSTETRICS & GYNECOLOGY | Admitting: OBSTETRICS & GYNECOLOGY
Payer: COMMERCIAL

## (undated) DEVICE — SOL WATER IRRIG 1000ML BOTTLE 07139-09

## (undated) DEVICE — PREP CHLORAPREP 26ML TINTED ORANGE  260815

## (undated) RX ORDER — FENTANYL CITRATE 50 UG/ML
INJECTION, SOLUTION INTRAMUSCULAR; INTRAVENOUS
Status: DISPENSED
Start: 2020-11-23

## (undated) RX ORDER — SIMETHICONE 40MG/0.6ML
SUSPENSION, DROPS(FINAL DOSAGE FORM)(ML) ORAL
Status: DISPENSED
Start: 2020-11-23